# Patient Record
Sex: FEMALE | Race: WHITE
[De-identification: names, ages, dates, MRNs, and addresses within clinical notes are randomized per-mention and may not be internally consistent; named-entity substitution may affect disease eponyms.]

---

## 2018-03-23 ENCOUNTER — HOSPITAL ENCOUNTER (OUTPATIENT)
Dept: HOSPITAL 17 - CPRE | Age: 48
End: 2018-03-23
Payer: COMMERCIAL

## 2018-03-23 DIAGNOSIS — Z01.810: Primary | ICD-10-CM

## 2018-03-23 DIAGNOSIS — D25.9: ICD-10-CM

## 2018-03-23 DIAGNOSIS — R10.2: ICD-10-CM

## 2018-03-23 DIAGNOSIS — Z01.812: ICD-10-CM

## 2018-03-23 PROCEDURE — 86901 BLOOD TYPING SEROLOGIC RH(D): CPT

## 2018-03-23 PROCEDURE — 93005 ELECTROCARDIOGRAM TRACING: CPT

## 2018-03-23 PROCEDURE — 86900 BLOOD TYPING SEROLOGIC ABO: CPT

## 2018-03-23 PROCEDURE — 86850 RBC ANTIBODY SCREEN: CPT

## 2018-03-23 NOTE — MH
cc:

Barry Onofre MD

****

 

 

DATE OF ADMISSION:

03/24/2018

 

ADMITTING DIAGNOSIS:

Pelvic pain with degenerating fibroid.

 

HISTORY OF PRESENT ILLNESS:

The patient is a 47-year-old single white female, para 3-0-0-3, who 

has developed right-sided pelvic pain, severe for over 2 weeks, had 

initially started about 2 or 3 months ago, had ER evaluation, CT scan 

was normal.  She had increased symptoms on 03/17/2018 and went to the 

ER.  Ultrasound showed a uterus with a degenerating fibroid on the 

right side consistent with her region of pain.  Ovaries could not be 

seen.  She has failed to improve with analgesics such as Motrin, 

Tramadol and Lortab.  She would like to proceed with surgical 

evaluation.

 

PREVIOUS SURGERY:

In the year 2000, she had a postpartum tubal.  In 2008, had 

endometrial ablation.

 

MEDICATIONS:

1.  Motrin.

2.  Tramadol.

3.  Lortab.

 

ALLERGIES:

PENICILLIN.

 

TRANSFUSIONS:

None.

 

OBSTETRIC HISTORY:

Three vaginal deliveries.

 

SOCIAL HISTORY:

She is single.  She is employed at [employer].  Alcohol, tobacco and drugs

are none.

 

FAMILY HISTORY:

Noncontributory.

 

REVIEW OF SYSTEMS:

Negative.

 

PHYSICAL EXAMINATION:

GENERAL:  This is a middle-aged white female, overweight.

VITAL SIGNS:   Height 5 feet, 9 inches,

HEENT:  Exam normal.

CHEST:  Clear.

HEART:  Regular rate.

BREASTS:  Symmetrical.

ABDOMEN:  Benign.

PELVIC:  Normal external genitalia and BUS.  Vagina is normal.  Cervix

normal.  Uterus is enlarged, tender on the right side of the fundus.

Adnexa nonpalpable.

 

ASSESSMENT:

As above.

 

PLAN:

She now presents for laparoscopy, probable LASH procedure, possible 

BSO, possible DAVID-BSO.

 

While in the office she was explained all procedures, the risks, 

benefits and complications.  The patient is aware of the procedure is 

intended to relieve her pain, but may not relieve all symptoms.  She 

would like to proceed.

 

 

 

__________________________________

MD NU Isaac/SB

D: 03/23/2018, 02:50 PM

T: 03/23/2018, 03:54 PM

Visit #: [id number]

Job #: [id number]

## 2018-03-24 ENCOUNTER — HOSPITAL ENCOUNTER (OUTPATIENT)
Dept: HOSPITAL 17 - HSDC | Age: 48
Setting detail: OBSERVATION
LOS: 1 days | Discharge: HOME | End: 2018-03-25
Payer: COMMERCIAL

## 2018-03-24 VITALS
OXYGEN SATURATION: 96 % | RESPIRATION RATE: 16 BRPM | HEART RATE: 89 BPM | TEMPERATURE: 98.5 F | DIASTOLIC BLOOD PRESSURE: 67 MMHG | SYSTOLIC BLOOD PRESSURE: 128 MMHG

## 2018-03-24 VITALS
DIASTOLIC BLOOD PRESSURE: 73 MMHG | SYSTOLIC BLOOD PRESSURE: 128 MMHG | RESPIRATION RATE: 16 BRPM | TEMPERATURE: 97.6 F | OXYGEN SATURATION: 99 % | HEART RATE: 75 BPM

## 2018-03-24 VITALS
TEMPERATURE: 98.1 F | RESPIRATION RATE: 17 BRPM | SYSTOLIC BLOOD PRESSURE: 106 MMHG | DIASTOLIC BLOOD PRESSURE: 74 MMHG | HEART RATE: 74 BPM

## 2018-03-24 VITALS — WEIGHT: 250.22 LBS | BODY MASS INDEX: 35.82 KG/M2 | HEIGHT: 70 IN

## 2018-03-24 DIAGNOSIS — R10.2: Primary | ICD-10-CM

## 2018-03-24 DIAGNOSIS — D25.9: ICD-10-CM

## 2018-03-24 DIAGNOSIS — Z87.891: ICD-10-CM

## 2018-03-24 LAB
HCT VFR BLD CALC: 41.7 % (ref 35–46)
HGB BLD-MCNC: 14.5 GM/DL (ref 11.6–15.3)

## 2018-03-24 PROCEDURE — 85018 HEMOGLOBIN: CPT

## 2018-03-24 PROCEDURE — 85014 HEMATOCRIT: CPT

## 2018-03-24 PROCEDURE — 58542 LSH W/T/O UT 250 G OR LESS: CPT

## 2018-03-24 PROCEDURE — C9290 INJ, BUPIVACAINE LIPOSOME: HCPCS

## 2018-03-24 PROCEDURE — 80048 BASIC METABOLIC PNL TOTAL CA: CPT

## 2018-03-24 PROCEDURE — 88307 TISSUE EXAM BY PATHOLOGIST: CPT

## 2018-03-24 PROCEDURE — G0378 HOSPITAL OBSERVATION PER HR: HCPCS

## 2018-03-24 PROCEDURE — 00840 ANES IPER PX LOWER ABD NOS: CPT

## 2018-03-24 PROCEDURE — 85025 COMPLETE CBC W/AUTO DIFF WBC: CPT

## 2018-03-24 PROCEDURE — 94150 VITAL CAPACITY TEST: CPT

## 2018-03-24 RX ADMIN — HYDROMORPHONE HYDROCHLORIDE PRN MG: 2 INJECTION INTRAMUSCULAR; INTRAVENOUS; SUBCUTANEOUS at 22:23

## 2018-03-24 RX ADMIN — POTASSIUM CHLORIDE, DEXTROSE MONOHYDRATE AND SODIUM CHLORIDE SCH MLS/HR: 150; 5; 450 INJECTION, SOLUTION INTRAVENOUS at 21:25

## 2018-03-24 RX ADMIN — DOCUSATE SODIUM SCH MG: 100 CAPSULE, LIQUID FILLED ORAL at 10:00

## 2018-03-24 RX ADMIN — POTASSIUM CHLORIDE, DEXTROSE MONOHYDRATE AND SODIUM CHLORIDE SCH MLS/HR: 150; 5; 450 INJECTION, SOLUTION INTRAVENOUS at 10:00

## 2018-03-24 RX ADMIN — CLONIDINE HYDROCHLORIDE SCH MG: 0.1 INJECTION, SOLUTION EPIDURAL at 10:00

## 2018-03-24 RX ADMIN — ACETAMINOPHEN SCH MG: 10 INJECTION, SOLUTION INTRAVENOUS at 10:00

## 2018-03-24 RX ADMIN — ACETAMINOPHEN SCH MG: 10 INJECTION, SOLUTION INTRAVENOUS at 18:27

## 2018-03-24 RX ADMIN — CLONIDINE HYDROCHLORIDE SCH MG: 0.1 INJECTION, SOLUTION EPIDURAL at 21:20

## 2018-03-24 RX ADMIN — HYDROMORPHONE HYDROCHLORIDE PRN MG: 2 INJECTION INTRAMUSCULAR; INTRAVENOUS; SUBCUTANEOUS at 11:16

## 2018-03-24 RX ADMIN — CLONIDINE HYDROCHLORIDE SCH MG: 0.1 INJECTION, SOLUTION EPIDURAL at 16:15

## 2018-03-24 RX ADMIN — DOCUSATE SODIUM SCH MG: 100 CAPSULE, LIQUID FILLED ORAL at 21:20

## 2018-03-24 NOTE — EKG
Date Performed: 03/23/2018       Time Performed: 13:23:00

 

PTAGE:      47 years

 

EKG:      Sinus rhythm 

 

 NORMAL ECG 

 

NO PREVIOUS TRACING            

 

DOCTOR:   Fritz Goldstein  Interpretating Date/Time  03/24/2018 19:11:38

## 2018-03-24 NOTE — MP
cc:

Barry Onofre MD

****

 

 

DATE OF OPERATION:

03/24/2018

 

PREOPERATIVE DIAGNOSIS:

Pelvic pain, degenerating fibroids.

 

POSTOPERATIVE DIAGNOSIS:

Pelvic pain, degenerating fibroids.

 

PROCEDURE:

Laparoscopy with a laparoscopic-assisted supracervical hysterectomy, 

bilateral salpingectomy.

 

ANESTHESIA:

General, ET.

 

SURGEON:

Barry Onofre MD

 

FIRST ASSISTANT:

RONEL Bautista

 

ESTIMATED BLOOD LOSS:

100 mL

 

FLUIDS:

1.1 liter crystalloid.

 

OBJECTIVE FINDINGS:

Following induction of adequate general endotracheal anesthesia, the 

patient was prepped and draped supine on the operating table in the 

usual sterile fashion with the bladder being drained via Mcnair 

catheterization.  The abdomen was opened through a 3 cm curving 

infraumbilical incision using a knife cut down through the skin to the

fascia.  The fascia was opened transversely, stripped from the 

muscles, the rectus muscle split in the midline and the peritoneum 

opened sharply without incident.  The GelPort was placed, laparoscope 

inserted.  A 5 port was placed in the left lower quadrant and an 

AirSeal port in the right lower quadrant.  The uterus was about 

12-weeks size, multiple fibroids.  Ovaries were normal.  Tubes 

appeared with interruption.  Cul-de-sacs were clear.  Liver edge was 

normal.

 

Working first on the left, harmonic scalpel was used to incise the 

left mesosalpinx, left round ligament, left broad ligament, left-sided

bladder flap and left uterine vessels.  Same on the right.  Harmonic 

scalpel was now used to amputate the fundus from the cervix and the 

fundus and tubes extracted in a pouch through the GelPort site.  

Irrigation performed.  Low pressure tests were done in the 

Trendelenburg and neutral position.  There was no bleeding.  Ureters 

were inspected, good peristalsis.

 

The operative site was now coated with Evicel.  GelPort was removed.  

Peritoneum was sutured with running 2-0 Vicryl, the fascia with a 

running locking stitch of 0 Vicryl from corners to the midline and 

tied, the subcutaneous closed with running 3-0 Vicryl and the skin 

with a running subcuticular 3-0 Monocryl.  The scope was now 

reinserted through the lower port to inspect the GelPort site; it was 

well closed.  No entrapment of viscera or tissue.  The pelvis was 

inspected with no bleeding so the scope was removed, gas was allowed 

to escape.  The small ports removed, sutured with 3-0 Monocryl.

 

The patient was to receive a TAP block, then moved to the recovery 

room.

 

 

__________________________________

MD NU Isaac/DEVI

D: 03/24/2018, 09:26 AM

T: 03/24/2018, 09:48 AM

Visit #: [id number]

Job #: [id number]

## 2018-03-25 VITALS
RESPIRATION RATE: 18 BRPM | SYSTOLIC BLOOD PRESSURE: 115 MMHG | TEMPERATURE: 98.6 F | OXYGEN SATURATION: 95 % | HEART RATE: 77 BPM | DIASTOLIC BLOOD PRESSURE: 57 MMHG

## 2018-03-25 VITALS
SYSTOLIC BLOOD PRESSURE: 103 MMHG | HEART RATE: 90 BPM | TEMPERATURE: 98.5 F | RESPIRATION RATE: 18 BRPM | DIASTOLIC BLOOD PRESSURE: 68 MMHG

## 2018-03-25 VITALS
TEMPERATURE: 98.5 F | DIASTOLIC BLOOD PRESSURE: 59 MMHG | SYSTOLIC BLOOD PRESSURE: 104 MMHG | HEART RATE: 72 BPM | RESPIRATION RATE: 17 BRPM

## 2018-03-25 LAB
BASOPHILS # BLD AUTO: 0 TH/MM3 (ref 0–0.2)
BASOPHILS NFR BLD: 0.2 % (ref 0–2)
BUN SERPL-MCNC: 10 MG/DL (ref 7–18)
CALCIUM SERPL-MCNC: 8.1 MG/DL (ref 8.5–10.1)
CHLORIDE SERPL-SCNC: 104 MEQ/L (ref 98–107)
CREAT SERPL-MCNC: 0.75 MG/DL (ref 0.5–1)
EOSINOPHIL # BLD: 0 TH/MM3 (ref 0–0.4)
EOSINOPHIL NFR BLD: 0 % (ref 0–4)
ERYTHROCYTE [DISTWIDTH] IN BLOOD BY AUTOMATED COUNT: 13.2 % (ref 11.6–17.2)
GFR SERPLBLD BASED ON 1.73 SQ M-ARVRAT: 83 ML/MIN (ref 89–?)
GLUCOSE SERPL-MCNC: 136 MG/DL (ref 74–106)
HCO3 BLD-SCNC: 25.8 MEQ/L (ref 21–32)
HCT VFR BLD CALC: 38.4 % (ref 35–46)
HGB BLD-MCNC: 12.8 GM/DL (ref 11.6–15.3)
LYMPHOCYTES # BLD AUTO: 0.9 TH/MM3 (ref 1–4.8)
LYMPHOCYTES NFR BLD AUTO: 6.3 % (ref 9–44)
MCH RBC QN AUTO: 28.2 PG (ref 27–34)
MCHC RBC AUTO-ENTMCNC: 33.2 % (ref 32–36)
MCV RBC AUTO: 84.9 FL (ref 80–100)
MONOCYTE #: 0.7 TH/MM3 (ref 0–0.9)
MONOCYTES NFR BLD: 4.9 % (ref 0–8)
NEUTROPHILS # BLD AUTO: 12.9 TH/MM3 (ref 1.8–7.7)
NEUTROPHILS NFR BLD AUTO: 88.6 % (ref 16–70)
PLATELET # BLD: 205 TH/MM3 (ref 150–450)
PMV BLD AUTO: 9.7 FL (ref 7–11)
RBC # BLD AUTO: 4.53 MIL/MM3 (ref 4–5.3)
SODIUM SERPL-SCNC: 138 MEQ/L (ref 136–145)
WBC # BLD AUTO: 14.6 TH/MM3 (ref 4–11)

## 2018-03-25 RX ADMIN — ACETAMINOPHEN SCH MG: 10 INJECTION, SOLUTION INTRAVENOUS at 02:00

## 2018-03-25 RX ADMIN — DOCUSATE SODIUM SCH MG: 100 CAPSULE, LIQUID FILLED ORAL at 09:24

## 2018-03-25 RX ADMIN — POTASSIUM CHLORIDE, DEXTROSE MONOHYDRATE AND SODIUM CHLORIDE SCH MLS/HR: 150; 5; 450 INJECTION, SOLUTION INTRAVENOUS at 09:25

## 2018-03-25 RX ADMIN — POTASSIUM CHLORIDE, DEXTROSE MONOHYDRATE AND SODIUM CHLORIDE SCH MLS/HR: 150; 5; 450 INJECTION, SOLUTION INTRAVENOUS at 02:00

## 2018-03-25 RX ADMIN — CLONIDINE HYDROCHLORIDE SCH MG: 0.1 INJECTION, SOLUTION EPIDURAL at 09:25

## 2018-03-25 RX ADMIN — CLONIDINE HYDROCHLORIDE SCH MG: 0.1 INJECTION, SOLUTION EPIDURAL at 03:21

## 2018-03-25 RX ADMIN — ACETAMINOPHEN SCH MG: 10 INJECTION, SOLUTION INTRAVENOUS at 09:25

## 2018-03-25 NOTE — HHI.DCPOC
Discharge Care Plan


Report Symptoms to Your Doctor


-Temperature above 100.5 degrees


-Redness, of incision or excessive or foul smelling drainage


-Unusual pain or calf pain


-Increased vaginal bleeding


-Painful or difficulty urinating


-Feelings of extreme sadness or anxiety after 2 weeks


Goals to Promote Your Health


* To prevent worsening of your condition and complications


* To maintain your health at the optimal level


Directions to Meet Your Goals


*** Take your medications as prescribed


*** Follow your dietary instruction


*** Follow activity as directed


*** Ensure plenty of rest for recovery


*** Drink fluids for hydration








*** Keep your appointments as scheduled


*** Take your immunizations and boosters as scheduled


*** If your symptoms worsen call your PCP, if no PCP go to Urgent Care Center 

or Emergency Room***


*** Smoking is Dangerous to Your Health. Avoid second hand smoke***


***Call the 24-hour crisis hotline for domestic abuse at 1-693.261.5575***











Barry Onofre MD Mar 25, 2018 10:30